# Patient Record
Sex: FEMALE | Race: AMERICAN INDIAN OR ALASKA NATIVE
[De-identification: names, ages, dates, MRNs, and addresses within clinical notes are randomized per-mention and may not be internally consistent; named-entity substitution may affect disease eponyms.]

---

## 2017-06-27 ENCOUNTER — HOSPITAL ENCOUNTER (OUTPATIENT)
Dept: HOSPITAL 5 - SPVWC | Age: 82
Discharge: HOME | End: 2017-06-27
Payer: MEDICARE

## 2017-06-27 DIAGNOSIS — M13.852: ICD-10-CM

## 2017-06-27 DIAGNOSIS — M25.852: ICD-10-CM

## 2017-06-27 DIAGNOSIS — M25.851: ICD-10-CM

## 2017-06-27 DIAGNOSIS — Z87.891: ICD-10-CM

## 2017-06-27 DIAGNOSIS — M13.851: ICD-10-CM

## 2017-06-27 DIAGNOSIS — Z12.31: Primary | ICD-10-CM

## 2017-06-27 DIAGNOSIS — Z79.899: ICD-10-CM

## 2017-06-27 DIAGNOSIS — I10: ICD-10-CM

## 2017-06-27 PROCEDURE — 73552 X-RAY EXAM OF FEMUR 2/>: CPT

## 2017-06-27 PROCEDURE — 73521 X-RAY EXAM HIPS BI 2 VIEWS: CPT

## 2017-06-27 PROCEDURE — G0202 SCR MAMMO BI INCL CAD: HCPCS

## 2017-06-27 PROCEDURE — 77067 SCR MAMMO BI INCL CAD: CPT

## 2017-06-28 NOTE — XRAY REPORT
Pelvis and bilateral hips:



History: Leg pain.



Findings:



There is calcification noted at superior left sacroiliac joint. This 

may be related to old injury. Irregularity is noted of the adjacent 

sacral promontory. Focal 4 mm calcification mid sacrum probably 

nonspecific. Mild arthritic changes right and left hip. No soft tissue 

calcification.



Impression:



Findings as detailed above. If clinically indicated further evaluation 

may be recommended.

## 2017-06-28 NOTE — MAMMOGRAPHY REPORT
BILATERAL DIGITAL SCREENING MAMMOGRAM with CAD: 06/27/17 14:11:00



CLINICAL: Routine screening.



COMPARISON:01/26/15



FINDINGS: There are scattered areas of fibroglandular density.Left 

central biopsy clip. No mass, architectural distortion or suspicious 

calcifications.



IMPRESSION: No mammographic evidence of malignancy.



BI-RADS CATEGORY:  2 -- Benign



RECOMMENDATION: Routine mammographic screening in one year.



COMMENT:

Patient follow-up letters are generated by our  [employer] application.

## 2017-06-28 NOTE — XRAY REPORT
Right femur 2 views:



History: Pain. Findings:



No fracture previously treated lesion.



Impression:



Essentially negative right femur.

## 2017-07-19 ENCOUNTER — HOSPITAL ENCOUNTER (OUTPATIENT)
Dept: HOSPITAL 5 - LAB | Age: 82
Discharge: HOME | End: 2017-07-19
Attending: SPECIALIST
Payer: MEDICARE

## 2017-07-19 DIAGNOSIS — M31.6: Primary | ICD-10-CM

## 2017-07-19 DIAGNOSIS — Z87.891: ICD-10-CM

## 2017-07-19 DIAGNOSIS — I10: ICD-10-CM

## 2017-07-19 LAB
ALBUMIN SERPL-MCNC: 4.1 G/DL (ref 3.9–5)
ALBUMIN/GLOB SERPL: 1.2 %
ALP SERPL-CCNC: 137 UNITS/L (ref 35–129)
ALT SERPL-CCNC: 10 UNITS/L (ref 7–56)
ANION GAP SERPL CALC-SCNC: 20 MMOL/L
BILIRUB SERPL-MCNC: 0.2 MG/DL (ref 0.1–1.2)
BUN SERPL-MCNC: 14 MG/DL (ref 7–17)
BUN/CREAT SERPL: 17.5 %
CALCIUM SERPL-MCNC: 9.1 MG/DL (ref 8.4–10.2)
CHLORIDE SERPL-SCNC: 101.1 MMOL/L (ref 98–107)
CO2 SERPL-SCNC: 26 MMOL/L (ref 22–30)
GLUCOSE SERPL-MCNC: 109 MG/DL (ref 65–100)
HCT VFR BLD CALC: 39.6 % (ref 30.3–42.9)
HGB BLD-MCNC: 12.7 GM/DL (ref 10.1–14.3)
MCH RBC QN AUTO: 29 PG (ref 28–32)
MCHC RBC AUTO-ENTMCNC: 32 % (ref 30–34)
MCV RBC AUTO: 89 FL (ref 79–97)
PLATELET # BLD: 219 K/MM3 (ref 140–440)
POTASSIUM SERPL-SCNC: 4.9 MMOL/L (ref 3.6–5)
PROT SERPL-MCNC: 7.5 G/DL (ref 6.3–8.2)
RBC # BLD AUTO: 4.46 M/MM3 (ref 3.65–5.03)
SODIUM SERPL-SCNC: 142 MMOL/L (ref 137–145)
WBC # BLD AUTO: 6.1 K/MM3 (ref 4.5–11)

## 2017-07-19 PROCEDURE — 85027 COMPLETE CBC AUTOMATED: CPT

## 2017-07-19 PROCEDURE — 85652 RBC SED RATE AUTOMATED: CPT

## 2017-07-19 PROCEDURE — 80053 COMPREHEN METABOLIC PANEL: CPT

## 2017-07-19 PROCEDURE — 36415 COLL VENOUS BLD VENIPUNCTURE: CPT

## 2017-12-06 ENCOUNTER — HOSPITAL ENCOUNTER (OUTPATIENT)
Dept: HOSPITAL 5 - NM | Age: 82
Discharge: HOME | End: 2017-12-06
Attending: INTERNAL MEDICINE
Payer: MEDICARE

## 2017-12-06 DIAGNOSIS — R11.0: Primary | ICD-10-CM

## 2017-12-06 PROCEDURE — A9541 TC99M SULFUR COLLOID: HCPCS

## 2017-12-06 PROCEDURE — 78264 GASTRIC EMPTYING IMG STUDY: CPT

## 2017-12-06 NOTE — NUCLEAR MEDICINE REPORT
NUCLEAR MEDICINE GASTRIC EMPTYING SCAN



History: Nausea.



Findings: Anterior abdominal images were obtained for 90 minutes 

following ingestion of 1.0 mCi of technetium 99m sulfur colloid in 

oatmeal. Half life for gastric emptying measures 32 minutes. No 

scintigraphic evidence for reflux disease.



Impression: Normal gastric emptying.

## 2018-01-11 ENCOUNTER — HOSPITAL ENCOUNTER (OUTPATIENT)
Dept: HOSPITAL 5 - SPVIMAG | Age: 83
Discharge: HOME | End: 2018-01-11
Payer: MEDICARE

## 2018-01-11 DIAGNOSIS — M43.16: ICD-10-CM

## 2018-01-11 DIAGNOSIS — M51.36: Primary | ICD-10-CM

## 2018-01-11 DIAGNOSIS — M41.86: ICD-10-CM

## 2018-01-11 PROCEDURE — 72100 X-RAY EXAM L-S SPINE 2/3 VWS: CPT

## 2018-01-11 NOTE — XRAY REPORT
XRAY LUMBAR SPINE THREE VIEWS: 01/11/18 13:33:00





CLINICAL: Back pain.



FINDINGS: Moderate levoscoliosis centered at L3-4.  Grade I L5-S1 

spondylolisthesis with no pars defects identified.  The rest of the 

bodies are in normal alignment.  Normal vertebral body height.  

Degenerative disc disease at all levels.  The greatest narrowing is 

accompanied by vacuum disc phenomenon at L5-S1, L4-5, L3-4 and L2-3.  

Large anterior and lateral osteophytes.  Multilevel facet joint 

disease.  The pedicles are intact.  No fracture.  Normal soft tissues.



IMPRESSION: Scoliosis and extensive multilevel degenerative disc 

disease and facet joint disease.

## 2021-08-16 NOTE — MAMMOGRAPHY REPORT
DIGITAL SCREENING MAMMOGRAM WITH CAD, 8/13/2021



CLINICAL INFORMATION / INDICATION: Routine screening mammography. SCREENING MAMMO Z12.31



TECHNIQUE:  Digital bilateral 2D mammography was obtained in the craniocaudal and mediolateral obliqu
e projections. This examination was interpreted with the benefit of Computer-Aided Detection analysis
.



COMPARISON: 8/12/2020, 6/27/2017.



FINDINGS: 



Breast Density: There are scattered areas of fibroglandular density.



No dominant mass, suspicious calcifications, or architectural distortion in either breast. 





 

IMPRESSION: No mammographic evidence of malignancy.



Follow up recommendation: Routine yearly



BI-RADS Category 1:  Negative.





-------------------------------------------------------------------------------------------

A "normal" or negative report should not discourage follow up or biopsy of a clinically significant f
inding.



A written summary of these findings will be mailed to the patient. The patient will be entered into a
 mammography reporting system which will generate a reminder letter for the patient's next appointmen
t at the appropriate interval.



The [employer] recommends yearly mammograms starting at age 40 and continuing as l
gayathri as a woman is in good health.  Breast MRI is recommended for women with an approximate 20-25% or 
greater lifetime risk of breast cancer, including women with a strong family history of breast or ova
elia cancer or who have been treated for Hodgkin's disease.



Signer Name: Quoc Hwang MD 

Signed: 8/16/2021 11:15 AM

Workstation Name: [email or web address] [email or web address]